# Patient Record
Sex: MALE | Race: WHITE | URBAN - METROPOLITAN AREA
[De-identification: names, ages, dates, MRNs, and addresses within clinical notes are randomized per-mention and may not be internally consistent; named-entity substitution may affect disease eponyms.]

---
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
## 2025-03-27 ENCOUNTER — OFFICE VISIT (OUTPATIENT)
Dept: URGENT CARE | Facility: CLINIC | Age: 20
End: 2025-03-27
Payer: COMMERCIAL

## 2025-03-27 VITALS
SYSTOLIC BLOOD PRESSURE: 118 MMHG | RESPIRATION RATE: 18 BRPM | WEIGHT: 125 LBS | DIASTOLIC BLOOD PRESSURE: 74 MMHG | BODY MASS INDEX: 18.51 KG/M2 | OXYGEN SATURATION: 97 % | HEART RATE: 72 BPM | HEIGHT: 69 IN | TEMPERATURE: 99 F

## 2025-03-27 DIAGNOSIS — K52.9 GASTROENTERITIS: Primary | ICD-10-CM

## 2025-03-27 PROCEDURE — 99213 OFFICE O/P EST LOW 20 MIN: CPT | Mod: ,,, | Performed by: FAMILY MEDICINE

## 2025-03-27 NOTE — PROGRESS NOTES
"Subjective:      Patient ID: Ghassan Martin is a 19 y.o. male.    Vitals:  height is 5' 9" (1.753 m) and weight is 56.7 kg (125 lb). His temperature is 99.1 °F (37.3 °C). His blood pressure is 118/74 and his pulse is 72. His respiration is 18 and oxygen saturation is 97%.     Chief Complaint: Emesis     Patient is a 19 y.o. male who presents to urgent care with complaints of vomiting( 2 episodes last night 1 episode this morning), nausea  x yesterday.  Patient denies cough, NC, fever, EA, dizziness, diarrhea, SOB, CP, wheezing.  Patient states the vomiting has stopped.  States he is feeling better.  Denies any sharp or severe abdominal pain.  States he did miss work and needs a note      Emesis       Constitution: Negative.   HENT: Negative.     Neck: neck negative.   Cardiovascular: Negative.    Eyes: Negative.    Respiratory: Negative.     Gastrointestinal:  Positive for nausea and vomiting.   Genitourinary: Negative.    Musculoskeletal: Negative.    Skin: Negative.    Allergic/Immunologic: Negative.    Neurological: Negative.    Hematologic/Lymphatic: Negative.       Objective:     Physical Exam   Constitutional: He is oriented to person, place, and time.  Non-toxic appearance. He does not appear ill. No distress.   HENT:   Head: Normocephalic and atraumatic.   Mouth/Throat: Mucous membranes are moist.   Eyes: Conjunctivae are normal.   Cardiovascular: Normal rate.   Pulmonary/Chest: Effort normal. No respiratory distress.   Abdominal: Normal appearance. He exhibits no distension and no mass. Soft. There is no abdominal tenderness. There is no rebound and no guarding.   Neurological: He is alert and oriented to person, place, and time.   Skin: Skin is not diaphoretic.   Psychiatric: His behavior is normal. Mood, judgment and thought content normal.   Vitals reviewed.         Previous History      Review of patient's allergies indicates:  No Known Allergies    Past Medical History:   Diagnosis Date    Allergy  " "    Current Outpatient Medications   Medication Instructions    azithromycin (Z-JOSE) 250 MG tablet Take 2 tablets by mouth on day 1; Take 1 tablet by mouth on days 2-5      phenazopyridine (PYRIDIUM) 100 MG tablet Please specify directions, refills and quantity     Past Surgical History:   Procedure Laterality Date    CIRCUMCISION      ENDOSCOPY      HERNIA REPAIR      TONSILLECTOMY       Family History   Problem Relation Name Age of Onset    Hyperthyroidism Mother      Hypertension Father         Social History[1]     Physical Exam      Vital Signs Reviewed   /74   Pulse 72   Temp 99.1 °F (37.3 °C)   Resp 18   Ht 5' 9" (1.753 m)   Wt 56.7 kg (125 lb)   SpO2 97%   BMI 18.46 kg/m²        Procedures    Procedures     Labs     Results for orders placed or performed in visit on 24   POCT URINE DIPSTICK WITH MICROSCOPE, AUTOMATED    Collection Time: 24  2:07 PM   Result Value Ref Range    Color, UA Yellow     Spec Grav UA 1.030     pH, UA 5.5     WBC, UA Negative     Nitrite, UA Negative     Protein, POC Negative     Glucose, UA Negative     Ketones, UA Negative     Urobilinogen, UA 0.2     Bilirubin, POC Small     Blood, UA Negative        Assessment:     1. Gastroenteritis        Plan:   Hydrate with Pedialyte, Gatorade or powerade. When you decide to eat, keep your diet simple and bland. Bread, crackers, bananas, rice or broth for example. You can take immodium over the counter for non-bloody diarrhea. If there is blood in your diarrhea or vomit, seek medical attention immediately. If you develop abdominal pain or you have worsening of your abdominal pain, seek medical attention immediately in the emergency department..       Gastroenteritis                         [1]   Social History  Tobacco Use    Smoking status: Former     Current packs/day: 0.00     Types: Cigarettes     Quit date: 2024     Years since quittin.2     Passive exposure: Never    Smokeless tobacco: Never   Substance " Use Topics    Alcohol use: Never    Drug use: Never

## 2025-03-27 NOTE — PATIENT INSTRUCTIONS
Plan:   Hydrate with Pedialyte, Gatorade or powerade. When you decide to eat, keep your diet simple and bland. Bread, crackers, bananas, rice or broth for example. You can take immodium over the counter for non-bloody diarrhea. If there is blood in your diarrhea or vomit, seek medical attention immediately. If you develop abdominal pain or you have worsening of your abdominal pain, seek medical attention immediately in the emergency department..

## 2025-03-27 NOTE — LETTER
March 27, 2025      [facility] [place] [id number] [facility] [address]  Phone: [phone]       Patient: Ghassan Martin   YOB: 2005  Date of Visit: 03/27/2025    To Whom It May Concern:    Atul Martin  was at [facility] on 03/27/2025. The patient may return to work/school on 03/28/2025 with no restrictions. If you have any questions or concerns, or if I can be of further assistance, please do not hesitate to contact me.    Sincerely,    Koko Calero MA